# Patient Record
Sex: FEMALE | Race: WHITE
[De-identification: names, ages, dates, MRNs, and addresses within clinical notes are randomized per-mention and may not be internally consistent; named-entity substitution may affect disease eponyms.]

---

## 2016-11-20 VITALS — DIASTOLIC BLOOD PRESSURE: 79 MMHG | SYSTOLIC BLOOD PRESSURE: 132 MMHG

## 2018-02-16 ENCOUNTER — HOSPITAL ENCOUNTER (OUTPATIENT)
Dept: HOSPITAL 44 - RAD | Age: 53
End: 2018-02-16
Attending: NURSE PRACTITIONER
Payer: COMMERCIAL

## 2018-02-16 DIAGNOSIS — R10.9: ICD-10-CM

## 2018-02-16 DIAGNOSIS — M54.2: Primary | ICD-10-CM

## 2018-02-16 PROCEDURE — 74018 RADEX ABDOMEN 1 VIEW: CPT

## 2018-02-16 NOTE — DIAGNOSTIC IMAGING REPORT
RENAE CARMICHAEL (NP) - OP 

[facility]

[address] [address] [address]

 

 

 

 

Report Submission Date: 2018 11:00:58 AM CST

Patient       Study

Name:   MARIXA RANGEL       Date:   2018 10:18:22 AM CST

MRN:   [id number]       Modality Type:   CR

Gender:   F       Description:   ABDOMEN

:   [birth year]       Institution:   [facility]

Physician:   RENAE CARMICHAEL (KARO) - OP

     Accession:    [id number]

 

 

Examination: Abdomen 



History: RIGHT FLANK PAIN SINCE 16 (Hx) / RIGHT FLANK PAIN, LBP (DICOM Hx) 
/ RIGHT FLANK PAIN, LBP (Pt comments) 



Findings: 4 views obtained of the abdomen. No abnormal dilation of the large or 
small bowel. Air and stool throughout the large bowel. No suspicious 
calcification projecting over the renal fossa or the lower pelvic region. 
Presumed tubal ligation clips. Osseous structures are appropriate for age. 



Impression: No obstruction. 



No suspicious calcifications by plain film sensitivity.

 

Electronically signed on 2018 11:00:58 AM CST by:

Jayjay CASTILLO